# Patient Record
Sex: FEMALE | Race: BLACK OR AFRICAN AMERICAN | NOT HISPANIC OR LATINO | Employment: UNEMPLOYED | URBAN - METROPOLITAN AREA
[De-identification: names, ages, dates, MRNs, and addresses within clinical notes are randomized per-mention and may not be internally consistent; named-entity substitution may affect disease eponyms.]

---

## 2023-05-21 DIAGNOSIS — C50.411 MALIGNANT NEOPLASM OF UPPER-OUTER QUADRANT OF RIGHT BREAST IN FEMALE, ESTROGEN RECEPTOR NEGATIVE: Primary | ICD-10-CM

## 2023-05-21 DIAGNOSIS — Z17.1 MALIGNANT NEOPLASM OF UPPER-OUTER QUADRANT OF RIGHT BREAST IN FEMALE, ESTROGEN RECEPTOR NEGATIVE: Primary | ICD-10-CM

## 2023-05-21 PROBLEM — R92.1 BREAST CALCIFICATION SEEN ON MAMMOGRAM: Status: ACTIVE | Noted: 2023-05-21

## 2023-05-21 NOTE — PROGRESS NOTES
[facility]  MD Ayesha Lew, NP-C    Chief Complaint:   Cali Benedict is a 52 y.o. female presenting today for  6 month follow up for her breast cancer issues. She is due for Physical Examination, Mammogram, and Chest Xray  She reports no interval changes on her self-breast examination.     History of Present Illness:   Mrs. Benedict was diagnosed with right breast cancer in January 2017 at the age of 46. Prior to chemotherapy four [place] Lymph Nodes were negative. She completed rashid-adjuvant chemotherapy. She elected Lumpectomy that showed a 2.6 cm residual Grade III IDC with 2-3 mm of DCIS. Margins were negative. She completed adjuvant Radiation with Dr. Arnold. ER/MS Negative and HER 2 = +1. [facility] Comprehensive Panel Negative (2017). In May 2022 she was noted with benign appearing calcifications in the left breast and close follow up was recommended. MD::: Tracie Bates MD; Micah Castro MD; Jolly Gregory MD.    Past Medical History:   Diagnosis Date    Breast calcification seen on mammogram 5/21/2023    Breast disorder     Breast Neoplasm    Estrogen receptor negative status (ER-) 5/21/2023    Hypertension     Lymphedema     Potassium (K) deficiency       Past Surgical History:   Procedure Laterality Date    BREAST BIOPSY      BREAST LUMPECTOMY Right     CERVICAL BIOPSY      CHOLECYSTECTOMY      COLPOSCOPY      KNEE SURGERY      LASER ABLATION OF THE CERVIX      REDUCTION OF BOTH BREASTS          Current Outpatient Medications:     ADVAIR DISKUS 100-50 mcg/dose diskus inhaler, INHALE 1 PUFF INTO THE LUNGS TWICE DAILY 12 HOURS APART, Disp: , Rfl:     albuterol (PROVENTIL/VENTOLIN HFA) 90 mcg/actuation inhaler, INHALE 2 PUFFS BY MOUTH EVERY 4 HOURS AS NEEDED SHORTNESS OF BREATH, Disp: , Rfl:     amitriptyline (ELAVIL) 100 MG tablet, Take 100 mg by mouth every evening., Disp: , Rfl:     amLODIPine (NORVASC) 5 MG tablet, Take 5 mg by mouth once daily.,  Disp: , Rfl:     atorvastatin (LIPITOR) 40 MG tablet, Take 40 mg by mouth every evening., Disp: , Rfl:     chlorthalidone (HYGROTEN) 25 MG Tab, Take 25 mg by mouth once daily., Disp: , Rfl:     clobetasol 0.05% (TEMOVATE) 0.05 % Oint, , Disp: , Rfl:     estradioL (ESTRACE) 0.01 % (0.1 mg/gram) vaginal cream, Place 1 g vaginally twice a week., Disp: 8 g, Rfl: 11    gabapentin (NEURONTIN) 300 MG capsule, Take 300 mg by mouth 4 (four) times daily., Disp: , Rfl:     inhalat.spacing dev,large mask (OPTICHAMBER GENARO LG MASK) Spcr, USE AS DIRECTED WITH ALBUTEROL INHALER, Disp: , Rfl:     ketoconazole (NIZORAL) 2 % shampoo, Apply topically., Disp: , Rfl:     losartan (COZAAR) 25 MG tablet, Take 50 mg by mouth once daily., Disp: , Rfl:     metFORMIN (GLUCOPHAGE-XR) 500 MG ER 24hr tablet, TAKE 2 TABLETS BY MOUTH DAILY WITH BREAKFAST, Disp: , Rfl:     pregabalin (LYRICA) 150 MG capsule, Take 150 mg by mouth 3 (three) times daily., Disp: , Rfl:     valACYclovir (VALTREX) 500 MG tablet, , Disp: , Rfl:    Review of patient's allergies indicates:   Allergen Reactions    Hydrochloric acid (bulk) Anaphylaxis and Shortness Of Breath    Hydrochlorothiazide     Naproxen       Social History     Tobacco Use    Smoking status: Never    Smokeless tobacco: Never   Substance Use Topics    Alcohol use: Never      No family history on file.     Review of Systems   Integumentary:  Negative for color change, rash, mole/lesion, breast mass, breast discharge and breast tenderness.   Breast: Negative for mass and tenderness     Physical Exam   Constitutional: She is cooperative.   HENT:   Head: Normocephalic.   Pulmonary/Chest: She exhibits no mass. Right breast exhibits no inverted nipple, no mass, no nipple discharge, no skin change and no tenderness. Left breast exhibits no inverted nipple, no mass, no nipple discharge, no skin change and no tenderness.   Abdominal: Normal appearance.   Musculoskeletal: Swelling (right upper extremity  lymphedema) present. Lymphadenopathy:      Upper Body:      Right upper body: No supraclavicular or axillary adenopathy.      Left upper body: No supraclavicular or axillary adenopathy.     Neurological: She is alert.   Skin: No rash noted.      MAMMOGRAM REPORT: There are punctate calcifications seen in the retroareolar region of the left breast. Compared to the previous study, there are no significant changes.There are post-surgical findings seen in the right breast. Compared to the previous study, there are no significant changes.There is no mammographic evidence of malignancy. BI-RADS Category: Overall: 3 - Probably Benign.    CXR REPORT: will obtain today    NOTE:::We viewed her films together at today's visit.  We discussed the multiple views obtained and the important findings.  Even benign changes were mentioned and her questions were answered.  She knows that she may receive a formal letter or report from the Radiologist.  She is to contact us if she has questions.          Assessment/Plan  1. Malignant neoplasm of upper-outer quadrant of right breast in female, estrogen receptor negative  Assessment & Plan:   Patient is doing well and is being followed according to NCCN Guidelines. She understands that her imaging and exams have remained stable and is comfortable being followed in a conservative fashion. She understands the importance of monthly self-breast examination and knows to report any and all changes as they occur.        2. Estrogen receptor negative status (ER-)  Assessment & Plan:  Same as above      3. Breast calcification seen on mammogram  Assessment & Plan:  We reviewed our findings today and her questions were answered.  She understands that her imaging and exams have remained stable (and show nothing concerning).  She is comfortable being followed in a conservative fashion.      She understands the importance of monthly self-breast examination and knows to report any and all changes as  they occur.               Medical Decision Making:  It is my impression that this patient suffers all conditions contained in this medical document.  Each of these conditions did affect our plan of care and my medical decision making today.  It is my opinion that the medical decision making concerning this patient was of moderate difficulty based on the aforementioned conditions.  Any further recommendations will be communicated to the patient by me.  I have reviewed and verified her allergies, list of medications, medical and surgical histories, social history, and a pertinent review of symptoms.      Follow up:  6 months and prn    For:  JG (CHRISS) at [facility]      No orders of the defined types were placed in this encounter.     Addendum 5/30/23 1030 CXR: FINDINGS:  No focal pulmonary opacity or pleural fluid. The cardiac silhouette  is mildly enlarged. Stable slight rightward deviation of the trachea suggests left thyromegaly. No acute osseous abnormality. Surgical clips in the right breast and axilla. Cholecystectomy clips. Impression:  No acute cardiopulmonary disease identified

## 2023-05-30 ENCOUNTER — OFFICE VISIT (OUTPATIENT)
Dept: SURGERY | Facility: CLINIC | Age: 53
End: 2023-05-30
Payer: MEDICARE

## 2023-05-30 DIAGNOSIS — C50.411 MALIGNANT NEOPLASM OF UPPER-OUTER QUADRANT OF RIGHT BREAST IN FEMALE, ESTROGEN RECEPTOR NEGATIVE: Primary | ICD-10-CM

## 2023-05-30 DIAGNOSIS — Z17.1 ESTROGEN RECEPTOR NEGATIVE STATUS (ER-): ICD-10-CM

## 2023-05-30 DIAGNOSIS — Z17.1 MALIGNANT NEOPLASM OF UPPER-OUTER QUADRANT OF RIGHT BREAST IN FEMALE, ESTROGEN RECEPTOR NEGATIVE: Primary | ICD-10-CM

## 2023-05-30 DIAGNOSIS — R92.1 BREAST CALCIFICATION SEEN ON MAMMOGRAM: ICD-10-CM

## 2023-05-30 PROCEDURE — 4010F ACE/ARB THERAPY RXD/TAKEN: CPT | Mod: CPTII,S$GLB,, | Performed by: NURSE PRACTITIONER

## 2023-05-30 PROCEDURE — 99214 PR OFFICE/OUTPT VISIT, EST, LEVL IV, 30-39 MIN: ICD-10-PCS | Mod: S$GLB,,, | Performed by: NURSE PRACTITIONER

## 2023-05-30 PROCEDURE — 99214 OFFICE O/P EST MOD 30 MIN: CPT | Mod: S$GLB,,, | Performed by: NURSE PRACTITIONER

## 2023-05-30 PROCEDURE — 4010F PR ACE/ARB THEARPY RXD/TAKEN: ICD-10-PCS | Mod: CPTII,S$GLB,, | Performed by: NURSE PRACTITIONER

## 2023-10-31 ENCOUNTER — TELEPHONE (OUTPATIENT)
Dept: SURGERY | Facility: CLINIC | Age: 53
End: 2023-10-31
Payer: MEDICARE

## 2023-10-31 NOTE — TELEPHONE ENCOUNTER
----- Message from Manuela Henry sent at 10/31/2023  8:11 AM CDT -----  Contact: Cali  Type:  Needs Medical Advice    Who Called: Cali  Symptoms (please be specific):  Arm with Lymphedema is swollen and had small, red spots on it, doesn't hurt   How long has patient had these symptoms:   1-2 days  Would the patient rather a call back or a response via [facility]?  Call  Best Call Back Number:  [phone]  Additional Information:

## 2023-11-26 DIAGNOSIS — R92.1 BREAST CALCIFICATION SEEN ON MAMMOGRAM: Primary | ICD-10-CM

## 2023-12-04 ENCOUNTER — OFFICE VISIT (OUTPATIENT)
Dept: SURGERY | Facility: CLINIC | Age: 53
End: 2023-12-04
Payer: MEDICARE

## 2023-12-04 DIAGNOSIS — Z17.1 MALIGNANT NEOPLASM OF UPPER-OUTER QUADRANT OF RIGHT BREAST IN FEMALE, ESTROGEN RECEPTOR NEGATIVE: Primary | ICD-10-CM

## 2023-12-04 DIAGNOSIS — Z17.1 ESTROGEN RECEPTOR NEGATIVE STATUS (ER-): ICD-10-CM

## 2023-12-04 DIAGNOSIS — C50.411 MALIGNANT NEOPLASM OF UPPER-OUTER QUADRANT OF RIGHT BREAST IN FEMALE, ESTROGEN RECEPTOR NEGATIVE: Primary | ICD-10-CM

## 2023-12-04 DIAGNOSIS — R92.1 BREAST CALCIFICATION SEEN ON MAMMOGRAM: ICD-10-CM

## 2023-12-04 PROCEDURE — 4010F PR ACE/ARB THEARPY RXD/TAKEN: ICD-10-PCS | Mod: CPTII,S$GLB,, | Performed by: NURSE PRACTITIONER

## 2023-12-04 PROCEDURE — 99999 PR PBB SHADOW E&M-EST. PATIENT-LVL III: CPT | Mod: PBBFAC,,, | Performed by: NURSE PRACTITIONER

## 2023-12-04 PROCEDURE — 1159F MED LIST DOCD IN RCRD: CPT | Mod: CPTII,S$GLB,, | Performed by: NURSE PRACTITIONER

## 2023-12-04 PROCEDURE — 4010F ACE/ARB THERAPY RXD/TAKEN: CPT | Mod: CPTII,S$GLB,, | Performed by: NURSE PRACTITIONER

## 2023-12-04 PROCEDURE — 99214 OFFICE O/P EST MOD 30 MIN: CPT | Mod: S$GLB,,, | Performed by: NURSE PRACTITIONER

## 2023-12-04 PROCEDURE — 1159F PR MEDICATION LIST DOCUMENTED IN MEDICAL RECORD: ICD-10-PCS | Mod: CPTII,S$GLB,, | Performed by: NURSE PRACTITIONER

## 2023-12-04 PROCEDURE — 83615 LACTATE (LD) (LDH) ENZYME: CPT | Performed by: NURSE PRACTITIONER

## 2023-12-04 PROCEDURE — 1160F RVW MEDS BY RX/DR IN RCRD: CPT | Mod: CPTII,S$GLB,, | Performed by: NURSE PRACTITIONER

## 2023-12-04 PROCEDURE — 85025 COMPLETE CBC W/AUTO DIFF WBC: CPT | Performed by: NURSE PRACTITIONER

## 2023-12-04 PROCEDURE — 86300 IMMUNOASSAY TUMOR CA 15-3: CPT | Performed by: NURSE PRACTITIONER

## 2023-12-04 PROCEDURE — 1160F PR REVIEW ALL MEDS BY PRESCRIBER/CLIN PHARMACIST DOCUMENTED: ICD-10-PCS | Mod: CPTII,S$GLB,, | Performed by: NURSE PRACTITIONER

## 2023-12-04 PROCEDURE — 3044F HG A1C LEVEL LT 7.0%: CPT | Mod: CPTII,S$GLB,, | Performed by: NURSE PRACTITIONER

## 2023-12-04 PROCEDURE — 99214 PR OFFICE/OUTPT VISIT, EST, LEVL IV, 30-39 MIN: ICD-10-PCS | Mod: S$GLB,,, | Performed by: NURSE PRACTITIONER

## 2023-12-04 PROCEDURE — 80053 COMPREHEN METABOLIC PANEL: CPT | Performed by: NURSE PRACTITIONER

## 2023-12-04 PROCEDURE — 3044F PR MOST RECENT HEMOGLOBIN A1C LEVEL <7.0%: ICD-10-PCS | Mod: CPTII,S$GLB,, | Performed by: NURSE PRACTITIONER

## 2023-12-04 PROCEDURE — 82378 CARCINOEMBRYONIC ANTIGEN: CPT | Performed by: NURSE PRACTITIONER

## 2023-12-04 PROCEDURE — 99999 PR PBB SHADOW E&M-EST. PATIENT-LVL III: ICD-10-PCS | Mod: PBBFAC,,, | Performed by: NURSE PRACTITIONER

## 2023-12-04 NOTE — PROGRESS NOTES
[facility]  MD Ayesha Lew, NP-C    Date of Service: 12/4/2023      Chief Complaint:   Cali Benedict is a 53 y.o. female presenting today for  6 month follow up. She is due for Mammogram  She reports no interval changes on her self-breast examination.     History of Present Illness:   Mrs. Benedict was diagnosed with right breast cancer in January 2017 at the age of 46. Prior to chemotherapy four [place] Lymph Nodes were negative. She completed rashid-adjuvant chemotherapy. She elected Lumpectomy that showed a 2.6 cm residual Grade III IDC with 2-3 mm of DCIS. Margins were negative. She completed adjuvant Radiation with Dr. Arnold. ER/[place] Negative and HER 2 = +1. [facility] (2017[facility] In May 2022 she was noted with benign appearing calcifications in the left breast and close follow up was recommended. MD::: Tracie Bates MD; Micah Castro MD; Jolly Gregory MD.     Past Medical History:   Diagnosis Date    Breast calcification seen on mammogram 05/21/2023    Breast cancer     RT    Breast disorder     Breast Neoplasm    Estrogen receptor negative status (ER-) 05/21/2023    Hypertension     Lymphedema     Malignant neoplasm of upper-outer quadrant of right breast in female, estrogen receptor negative 05/21/2023    diagnosed in January 2017    Oxygen dependent     2 lnc    Potassium (K) deficiency       Past Surgical History:   Procedure Laterality Date    CERVICAL BIOPSY      CHOLECYSTECTOMY      COLPOSCOPY      KNEE SURGERY      LASER ABLATION OF THE CERVIX      REDUCTION OF BOTH BREASTS Bilateral     right lumpectomy w/ port removal 09/2017      right lumpectomy w/ port removal 09/2017      TOTAL REDUCTION MAMMOPLASTY          Current Outpatient Medications:     ADVAIR DISKUS 100-50 mcg/dose diskus inhaler, INHALE 1 PUFF INTO THE LUNGS TWICE DAILY 12 HOURS APART, Disp: , Rfl:     albuterol (PROVENTIL/VENTOLIN HFA) 90 mcg/actuation  inhaler, INHALE 2 PUFFS BY MOUTH EVERY 4 HOURS AS NEEDED SHORTNESS OF BREATH, Disp: , Rfl:     amitriptyline (ELAVIL) 100 MG tablet, Take 100 mg by mouth every evening., Disp: , Rfl:     amLODIPine (NORVASC) 5 MG tablet, Take 5 mg by mouth once daily., Disp: , Rfl:     atorvastatin (LIPITOR) 40 MG tablet, Take 40 mg by mouth every evening., Disp: , Rfl:     chlorthalidone (HYGROTEN) 25 MG Tab, Take 25 mg by mouth once daily., Disp: , Rfl:     clobetasol 0.05% (TEMOVATE) 0.05 % Oint, , Disp: , Rfl:     estradioL (ESTRACE) 0.01 % (0.1 mg/gram) vaginal cream, Place 1 g vaginally twice a week., Disp: 8 g, Rfl: 11    gabapentin (NEURONTIN) 300 MG capsule, Take 300 mg by mouth 4 (four) times daily., Disp: , Rfl:     inhalat.spacing dev,large mask (OPTICHAMBER GENARO LG MASK) Spcr, USE AS DIRECTED WITH ALBUTEROL INHALER, Disp: , Rfl:     ketoconazole (NIZORAL) 2 % shampoo, Apply topically., Disp: , Rfl:     losartan (COZAAR) 25 MG tablet, Take 50 mg by mouth once daily., Disp: , Rfl:     metFORMIN (GLUCOPHAGE-XR) 500 MG ER 24hr tablet, TAKE 2 TABLETS BY MOUTH DAILY WITH BREAKFAST, Disp: , Rfl:     pregabalin (LYRICA) 150 MG capsule, Take 150 mg by mouth 3 (three) times daily., Disp: , Rfl:     valACYclovir (VALTREX) 500 MG tablet, , Disp: , Rfl:    Review of patient's allergies indicates:   Allergen Reactions    Hydrochloric acid (bulk) Anaphylaxis and Shortness Of Breath    Hydrochlorothiazide     Naproxen       Social History     Tobacco Use    Smoking status: Never    Smokeless tobacco: Never   Substance Use Topics    Alcohol use: Never      Family History   Problem Relation Age of Onset    Breast cancer Maternal Aunt     Ovarian cancer Neg Hx         Review of Systems   Integumentary:  Negative for color change, rash, mole/lesion, breast mass, breast discharge and breast tenderness.   Breast: Negative for mass and tenderness       Physical Exam   HENT:   Head: Normocephalic.   Pulmonary/Chest: Right breast exhibits no  inverted nipple, no mass, no nipple discharge, no skin change and no tenderness. Left breast exhibits no inverted nipple, no mass, no nipple discharge, no skin change and no tenderness. No breast swelling.   Genitourinary: No breast swelling.   Musculoskeletal: Lymphadenopathy:      Upper Body:      Right upper body: No supraclavicular or axillary adenopathy.      Left upper body: No supraclavicular or axillary adenopathy.     Neurological: She is alert.      Mammogram: There are punctate calcifications seen in the retroareolar region of the left breast. Compared to the previous study, there are no significant changes. There are post-surgical findings seen in the right breast. Compared to the previous study, there are no significant changes. There is no mammographic evidence of malignancy.        Assessment/Plan  1. Malignant neoplasm of upper-outer quadrant of right breast in female, estrogen receptor negative  Assessment & Plan:   Patient is doing well and is being followed according to NCCN Guidelines. She understands that her imaging and exams have remained stable and is comfortable being followed in a conservative fashion. She understands the importance of monthly self-breast examination and knows to report any and all changes as they occur.        2. Estrogen receptor negative status (ER-)  Assessment & Plan:  Same as above      3. Breast calcification seen on mammogram  Assessment & Plan:  We reviewed our findings today and her questions were answered.  She understands that her imaging and exams have remained stable (and show nothing concerning).  She is comfortable being followed in a conservative fashion.      She understands the importance of monthly self-breast examination and knows to report any and all changes as they occur.               Medical Decision Making:  It is my impression that this patient suffers all conditions contained in this medical document.  Each of these conditions did affect our  plan of care and my medical decision making today.  It is my opinion that the medical decision making concerning this patient was of moderate difficulty based on the aforementioned conditions.  Any further recommendations will be communicated to the patient by me.  I have reviewed and verified her allergies, list of medications, medical and surgical histories, social history, and a pertinent review of symptoms.      Follow up:  6 months and prn    For:  CXR and Ultrasound with Dr. Zeng

## 2023-12-05 LAB
ALBUMIN SERPL BCP-MCNC: 3.9 G/DL (ref 3.5–5.2)
ALP SERPL-CCNC: 130 U/L (ref 55–135)
ALT SERPL W/O P-5'-P-CCNC: 23 U/L (ref 10–44)
ANION GAP SERPL CALC-SCNC: 11 MMOL/L (ref 8–16)
AST SERPL-CCNC: 22 U/L (ref 10–40)
BASOPHILS # BLD AUTO: 0.07 K/UL (ref 0–0.2)
BASOPHILS NFR BLD: 1 % (ref 0–1.9)
BILIRUB SERPL-MCNC: 0.3 MG/DL (ref 0.1–1)
BUN SERPL-MCNC: 13 MG/DL (ref 6–20)
CALCIUM SERPL-MCNC: 8.9 MG/DL (ref 8.7–10.5)
CEA SERPL-MCNC: 2 NG/ML (ref 0–5)
CHLORIDE SERPL-SCNC: 105 MMOL/L (ref 95–110)
CO2 SERPL-SCNC: 25 MMOL/L (ref 23–29)
CREAT SERPL-MCNC: 0.7 MG/DL (ref 0.5–1.4)
DIFFERENTIAL METHOD: ABNORMAL
EOSINOPHIL # BLD AUTO: 0.3 K/UL (ref 0–0.5)
EOSINOPHIL NFR BLD: 4.6 % (ref 0–8)
ERYTHROCYTE [DISTWIDTH] IN BLOOD BY AUTOMATED COUNT: 14.7 % (ref 11.5–14.5)
EST. GFR  (NO RACE VARIABLE): >60 ML/MIN/1.73 M^2
GLUCOSE SERPL-MCNC: 87 MG/DL (ref 70–110)
HCT VFR BLD AUTO: 38.5 % (ref 37–48.5)
HGB BLD-MCNC: 12.2 G/DL (ref 12–16)
IMM GRANULOCYTES # BLD AUTO: 0.01 K/UL (ref 0–0.04)
IMM GRANULOCYTES NFR BLD AUTO: 0.1 % (ref 0–0.5)
LDH SERPL L TO P-CCNC: 232 U/L (ref 110–260)
LYMPHOCYTES # BLD AUTO: 2.5 K/UL (ref 1–4.8)
LYMPHOCYTES NFR BLD: 34 % (ref 18–48)
MCH RBC QN AUTO: 25.2 PG (ref 27–31)
MCHC RBC AUTO-ENTMCNC: 31.7 G/DL (ref 32–36)
MCV RBC AUTO: 80 FL (ref 82–98)
MONOCYTES # BLD AUTO: 0.5 K/UL (ref 0.3–1)
MONOCYTES NFR BLD: 7.1 % (ref 4–15)
NEUTROPHILS # BLD AUTO: 3.9 K/UL (ref 1.8–7.7)
NEUTROPHILS NFR BLD: 53.2 % (ref 38–73)
NRBC BLD-RTO: 0 /100 WBC
PLATELET # BLD AUTO: 248 K/UL (ref 150–450)
PMV BLD AUTO: 11.3 FL (ref 9.2–12.9)
POTASSIUM SERPL-SCNC: 3.6 MMOL/L (ref 3.5–5.1)
PROT SERPL-MCNC: 7.3 G/DL (ref 6–8.4)
RBC # BLD AUTO: 4.84 M/UL (ref 4–5.4)
SODIUM SERPL-SCNC: 141 MMOL/L (ref 136–145)
WBC # BLD AUTO: 7.35 K/UL (ref 3.9–12.7)

## 2023-12-06 LAB — CANCER AG27-29 SERPL-ACNC: 27.6 U/ML

## 2024-05-20 DIAGNOSIS — Z17.1 MALIGNANT NEOPLASM OF UPPER-OUTER QUADRANT OF RIGHT BREAST IN FEMALE, ESTROGEN RECEPTOR NEGATIVE: Primary | ICD-10-CM

## 2024-05-20 DIAGNOSIS — C50.411 MALIGNANT NEOPLASM OF UPPER-OUTER QUADRANT OF RIGHT BREAST IN FEMALE, ESTROGEN RECEPTOR NEGATIVE: Primary | ICD-10-CM

## 2024-05-20 NOTE — PROGRESS NOTES
Date of Service: 6/10/2024    Chief Complaint:   Cali Benedict is a 53 y.o. female presenting today for her 6 month follow up due to her breast cancer diagnosis.  She is due for her Ultrasound and CXR.  She reports no interval changes on her self-breast examination.     History of Present Illness: Mrs. Benedict was diagnosed with right breast cancer in January 2017 at the age of 46. Prior to chemotherapy four [place] Lymph Nodes were negative. She completed rashid-adjuvant chemotherapy. She elected Lumpectomy that showed a 2.6 cm residual Grade III IDC with 2-3 mm of DCIS. Margins were negative. She completed adjuvant Radiation with Dr. Arnold. ER/[place] Negative and HER 2 = +1. [facility] Comprehensive Panel Negative (2017). In May 2022 she was noted with benign appearing calcifications in the left breast and close follow up was recommended. MD::: Tracie Bates MD; Micah Castro MD; Jolly Gregory MD.     Past Medical History:   Diagnosis Date    Breast calcification seen on mammogram 05/21/2023    Breast cancer     RT    Breast disorder     Breast Neoplasm    Estrogen receptor negative status (ER-) 05/21/2023    Hypertension     Lymphadenopathy     left neck    Lymphedema     Malignant neoplasm of upper-outer quadrant of right breast in female, estrogen receptor negative 05/21/2023    diagnosed in January 2017    Multiple thyroid nodules     Oxygen dependent     2 lnc    Potassium (K) deficiency       Past Surgical History:   Procedure Laterality Date    CERVICAL BIOPSY      CHOLECYSTECTOMY      COLPOSCOPY      FNA of thyroid nodule 11/2023      KNEE SURGERY      LASER ABLATION OF THE CERVIX      REDUCTION OF BOTH BREASTS Bilateral     right lumpectomy w/ port removal 09/2017      right lumpectomy w/ port removal 09/2017      TOTAL REDUCTION MAMMOPLASTY          Current Outpatient Medications:     ADVAIR DISKUS 100-50 mcg/dose diskus inhaler, INHALE 1 PUFF INTO THE LUNGS TWICE DAILY 12 HOURS APART, Disp: , Rfl:      albuterol (PROVENTIL/VENTOLIN HFA) 90 mcg/actuation inhaler, INHALE 2 PUFFS BY MOUTH EVERY 4 HOURS AS NEEDED SHORTNESS OF BREATH, Disp: , Rfl:     amitriptyline (ELAVIL) 100 MG tablet, Take 100 mg by mouth every evening., Disp: , Rfl:     amLODIPine (NORVASC) 5 MG tablet, Take 5 mg by mouth once daily., Disp: , Rfl:     atorvastatin (LIPITOR) 40 MG tablet, Take 40 mg by mouth every evening., Disp: , Rfl:     chlorthalidone (HYGROTEN) 25 MG Tab, Take 25 mg by mouth once daily., Disp: , Rfl:     clobetasol 0.05% (TEMOVATE) 0.05 % Oint, , Disp: , Rfl:     estradioL (ESTRACE) 0.01 % (0.1 mg/gram) vaginal cream, Place 1 g vaginally twice a week., Disp: 8 g, Rfl: 11    gabapentin (NEURONTIN) 300 MG capsule, Take 300 mg by mouth 4 (four) times daily., Disp: , Rfl:     inhalat.spacing dev,large mask (OPTICHAMBER GENARO LG MASK) Spcr, USE AS DIRECTED WITH ALBUTEROL INHALER, Disp: , Rfl:     ketoconazole (NIZORAL) 2 % shampoo, Apply topically., Disp: , Rfl:     losartan (COZAAR) 25 MG tablet, Take 50 mg by mouth once daily., Disp: , Rfl:     metFORMIN (GLUCOPHAGE-XR) 500 MG ER 24hr tablet, TAKE 2 TABLETS BY MOUTH DAILY WITH BREAKFAST, Disp: , Rfl:     pregabalin (LYRICA) 150 MG capsule, Take 150 mg by mouth 3 (three) times daily., Disp: , Rfl:     valACYclovir (VALTREX) 500 MG tablet, , Disp: , Rfl:    Review of patient's allergies indicates:   Allergen Reactions    Hydrochloric acid (bulk) Anaphylaxis and Shortness Of Breath    Hydrochlorothiazide     Naproxen       Social History     Tobacco Use    Smoking status: Never    Smokeless tobacco: Never   Substance Use Topics    Alcohol use: Never      Family History   Problem Relation Name Age of Onset    Breast cancer Maternal Aunt Aunt     Ovarian cancer Neg Hx          Review of Systems   Integumentary:  Negative for color change, rash, mole/lesion, breast mass, breast discharge and breast tenderness.   Breast: Negative for mass and tenderness       Physical Exam    Constitutional: She is cooperative.   HENT:   Head: Normocephalic.   Pulmonary/Chest: She exhibits no mass. Right breast exhibits no inverted nipple, no mass, no nipple discharge, no skin change and no tenderness. Left breast exhibits no inverted nipple, no mass, no nipple discharge, no skin change and no tenderness.   Abdominal: Normal appearance.   Musculoskeletal: Lymphadenopathy:      Upper Body:      Right upper body: No supraclavicular or axillary adenopathy.      Left upper body: No supraclavicular or axillary adenopathy.     Neurological: She is alert.   Skin: No rash noted.     CXR REPORT: Her lungs are well aerated and without worrisome masses; no pleural effusion noted; NEM      ULTRASOUND EVALUATION and REPORT    Bilateral real-time Ultrasound was performed by me.  All four quadrants of the breast, the subareolar and axillary basins were scanned.    She has some heterogeneous dense fibroglandular tissue bilaterally.  We discussed  this dense tissue and its potential implications.    Right Breast: She has normal tissues in the right breast; there's no disruption of the tissue planes (except at the site of her scar(s)) and no abnormal shadowing; she has normal skin thickness with no subcutaneous nodules of skin thickening; stable fat necrosis in UOQ near scar; NEM     Left Breast: She has normal tissues in the left breast; there's no disruption of the tissue planes and no abnormal shadowing; she has normal skin thickness with no subcutaneous nodules or skin thickening; NEM     Axillae: There are no abnormal lymph nodes seen bilaterally.     Impression: Some dense but normal tissue bilaterally with no solid/suspicious masses noted. No LAD in bilateral axillae.      BIRADS: Category 2 - Benign Finding.    Findings were discussed with patient in real time and a hand written report was given to her at the conclusion of the exam.        ASSESSMENT and PLAN OF CARE     1. Malignant neoplasm of upper-outer  quadrant of right breast in female, estrogen receptor negative  Assessment & Plan:  She is doing well and will continue to be followed according to NCCN Guidelines. She understands that her imaging and exams have remained stable and is comfortable being followed in a conservative fashion. She understands the importance of monthly self-breast examination and knows to report any and all changes as they occur.    Labs obtained today: CBC, Chem 12, CEA, LDH, CA 27.29 - after resulted, these will be compared to her previous values and all abnormal values will be phoned to her for discussion.      Orders:  -     Lactate Dehydrogenase  -     Comprehensive Metabolic Panel  -     CEA  -     CBC Auto Differential  -     Cancer Antigen 27-29    Medical Decision Making: It is my impression that this patient suffers all conditions contained in this medical document.  Each of these conditions did affect our plan of care and my medical decision making today.  It is my opinion that the medical decision making concerning this patient was of moderate difficulty based on the aforementioned conditions.  Any further recommendations will be communicated to the patient by me.  I have reviewed and verified her allergies, list of medications, medical and surgical histories, social history, and a pertinent review of symptoms.     Follow up:  6 months and prn    For:  Physical Examination, MGM (D) at [facility], and LABS         DYSPNEA ON EXERTION/SHORTNESS OF BREATH

## 2024-06-10 ENCOUNTER — OFFICE VISIT (OUTPATIENT)
Dept: SURGERY | Facility: CLINIC | Age: 54
End: 2024-06-10
Payer: MEDICARE

## 2024-06-10 DIAGNOSIS — Z17.1 MALIGNANT NEOPLASM OF UPPER-OUTER QUADRANT OF RIGHT BREAST IN FEMALE, ESTROGEN RECEPTOR NEGATIVE: Primary | ICD-10-CM

## 2024-06-10 DIAGNOSIS — C50.411 MALIGNANT NEOPLASM OF UPPER-OUTER QUADRANT OF RIGHT BREAST IN FEMALE, ESTROGEN RECEPTOR NEGATIVE: Primary | ICD-10-CM

## 2024-06-10 LAB
ALBUMIN SERPL BCP-MCNC: 3.8 G/DL (ref 3.5–5.2)
ALP SERPL-CCNC: 191 U/L (ref 55–135)
ALT SERPL W/O P-5'-P-CCNC: 22 U/L (ref 10–44)
ANION GAP SERPL CALC-SCNC: 11 MMOL/L (ref 8–16)
AST SERPL-CCNC: 20 U/L (ref 10–40)
BASOPHILS # BLD AUTO: 0.06 K/UL (ref 0–0.2)
BASOPHILS NFR BLD: 0.9 % (ref 0–1.9)
BILIRUB SERPL-MCNC: 0.2 MG/DL (ref 0.1–1)
BUN SERPL-MCNC: 10 MG/DL (ref 6–20)
CALCIUM SERPL-MCNC: 9.2 MG/DL (ref 8.7–10.5)
CEA SERPL-MCNC: 2.1 NG/ML (ref 0–5)
CHLORIDE SERPL-SCNC: 107 MMOL/L (ref 95–110)
CO2 SERPL-SCNC: 26 MMOL/L (ref 23–29)
CREAT SERPL-MCNC: 0.8 MG/DL (ref 0.5–1.4)
DIFFERENTIAL METHOD BLD: ABNORMAL
EOSINOPHIL # BLD AUTO: 0.3 K/UL (ref 0–0.5)
EOSINOPHIL NFR BLD: 3.9 % (ref 0–8)
ERYTHROCYTE [DISTWIDTH] IN BLOOD BY AUTOMATED COUNT: 14.6 % (ref 11.5–14.5)
EST. GFR  (NO RACE VARIABLE): >60 ML/MIN/1.73 M^2
GLUCOSE SERPL-MCNC: 85 MG/DL (ref 70–110)
HCT VFR BLD AUTO: 37.7 % (ref 37–48.5)
HGB BLD-MCNC: 12 G/DL (ref 12–16)
IMM GRANULOCYTES # BLD AUTO: 0.02 K/UL (ref 0–0.04)
IMM GRANULOCYTES NFR BLD AUTO: 0.3 % (ref 0–0.5)
LDH SERPL L TO P-CCNC: 219 U/L (ref 110–260)
LYMPHOCYTES # BLD AUTO: 1.8 K/UL (ref 1–4.8)
LYMPHOCYTES NFR BLD: 26.1 % (ref 18–48)
MCH RBC QN AUTO: 25.8 PG (ref 27–31)
MCHC RBC AUTO-ENTMCNC: 31.8 G/DL (ref 32–36)
MCV RBC AUTO: 81 FL (ref 82–98)
MONOCYTES # BLD AUTO: 0.5 K/UL (ref 0.3–1)
MONOCYTES NFR BLD: 7.8 % (ref 4–15)
NEUTROPHILS # BLD AUTO: 4.3 K/UL (ref 1.8–7.7)
NEUTROPHILS NFR BLD: 61 % (ref 38–73)
NRBC BLD-RTO: 0 /100 WBC
PLATELET # BLD AUTO: 223 K/UL (ref 150–450)
PMV BLD AUTO: 11.3 FL (ref 9.2–12.9)
POTASSIUM SERPL-SCNC: 3.5 MMOL/L (ref 3.5–5.1)
PROT SERPL-MCNC: 7.1 G/DL (ref 6–8.4)
RBC # BLD AUTO: 4.65 M/UL (ref 4–5.4)
SODIUM SERPL-SCNC: 144 MMOL/L (ref 136–145)
WBC # BLD AUTO: 6.96 K/UL (ref 3.9–12.7)

## 2024-06-10 PROCEDURE — 80053 COMPREHEN METABOLIC PANEL: CPT | Performed by: SPECIALIST

## 2024-06-10 PROCEDURE — 83615 LACTATE (LD) (LDH) ENZYME: CPT | Performed by: SPECIALIST

## 2024-06-10 PROCEDURE — 3044F HG A1C LEVEL LT 7.0%: CPT | Mod: CPTII,S$GLB,, | Performed by: SPECIALIST

## 2024-06-10 PROCEDURE — 1160F RVW MEDS BY RX/DR IN RCRD: CPT | Mod: CPTII,S$GLB,, | Performed by: SPECIALIST

## 2024-06-10 PROCEDURE — 82378 CARCINOEMBRYONIC ANTIGEN: CPT | Performed by: SPECIALIST

## 2024-06-10 PROCEDURE — 4010F ACE/ARB THERAPY RXD/TAKEN: CPT | Mod: CPTII,S$GLB,, | Performed by: SPECIALIST

## 2024-06-10 PROCEDURE — 85025 COMPLETE CBC W/AUTO DIFF WBC: CPT | Performed by: SPECIALIST

## 2024-06-10 PROCEDURE — 1159F MED LIST DOCD IN RCRD: CPT | Mod: CPTII,S$GLB,, | Performed by: SPECIALIST

## 2024-06-10 PROCEDURE — 99999 PR PBB SHADOW E&M-EST. PATIENT-LVL III: CPT | Mod: PBBFAC,,, | Performed by: SPECIALIST

## 2024-06-10 PROCEDURE — 86300 IMMUNOASSAY TUMOR CA 15-3: CPT | Performed by: SPECIALIST

## 2024-06-10 PROCEDURE — 99214 OFFICE O/P EST MOD 30 MIN: CPT | Mod: S$GLB,,, | Performed by: SPECIALIST

## 2024-06-13 LAB — CANCER AG27-29 SERPL-ACNC: 32.7 U/ML

## 2024-08-06 ENCOUNTER — TELEPHONE (OUTPATIENT)
Dept: SURGERY | Facility: CLINIC | Age: 54
End: 2024-08-06
Payer: MEDICARE

## 2024-11-25 DIAGNOSIS — Z17.1 MALIGNANT NEOPLASM OF UPPER-OUTER QUADRANT OF RIGHT BREAST IN FEMALE, ESTROGEN RECEPTOR NEGATIVE: Primary | ICD-10-CM

## 2024-11-25 DIAGNOSIS — C50.411 MALIGNANT NEOPLASM OF UPPER-OUTER QUADRANT OF RIGHT BREAST IN FEMALE, ESTROGEN RECEPTOR NEGATIVE: Primary | ICD-10-CM

## 2024-11-26 NOTE — PROGRESS NOTES
Date of Service: 12/16/2024    Chief Complaint:   Cali Benedict is a 54 y.o. female presenting today for her 6 month follow up due to her breast cancer diagnosis.  She is due for her Mammogram.  She reports no interval changes on her self-breast examination.     History of Present Illness: Mrs. Benedict was diagnosed with right breast cancer in January 2017 at the age of 46. Prior to chemotherapy four Hen[place]er Lymph Nodes were negative. She completed rashid-adjuvant chemotherapy. She elected Lumpectomy that showed a 2.6 cm residual Grade III IDC with 2-3 mm of DCIS. Margins were negative. She completed adjuvant Radiation with Dr. Arnold. ER/AK Negative and HER 2 = +1. [facility] Comprehensive Panel Negative (2017). In May 2022 she was noted with benign appearing calcifications in the left breast and close follow up was recommended. MD::: Tracie Bates MD; Micah Castro MD; Jolly Gregory MD.       Past Medical History:   Diagnosis Date    Breast calcification seen on mammogram 05/21/2023    Breast cancer     RT    Breast disorder     Breast Neoplasm    Estrogen receptor negative status (ER-) 05/21/2023    Hypertension     Lymphadenopathy     left neck    Lymphedema     Malignant neoplasm of upper-outer quadrant of right breast in female, estrogen receptor negative 05/21/2023    diagnosed in January 2017    Multiple thyroid nodules     Oxygen dependent     2 lnc    Potassium (K) deficiency       Past Surgical History:   Procedure Laterality Date    CERVICAL BIOPSY      CHOLECYSTECTOMY      COLPOSCOPY      FNA of thyroid nodule 11/2023      KNEE SURGERY      LASER ABLATION OF THE CERVIX      REDUCTION OF BOTH BREASTS Bilateral     right lumpectomy w/ port removal 09/2017      right lumpectomy w/ port removal 09/2017      TOTAL REDUCTION MAMMOPLASTY          Current Outpatient Medications:     ADVAIR DISKUS 100-50 mcg/dose diskus inhaler, INHALE 1 PUFF INTO THE LUNGS TWICE DAILY 12 HOURS APART, Disp: , Rfl:      albuterol (PROVENTIL/VENTOLIN HFA) 90 mcg/actuation inhaler, INHALE 2 PUFFS BY MOUTH EVERY 4 HOURS AS NEEDED SHORTNESS OF BREATH, Disp: , Rfl:     amitriptyline (ELAVIL) 100 MG tablet, Take 100 mg by mouth every evening., Disp: , Rfl:     amLODIPine (NORVASC) 5 MG tablet, Take 5 mg by mouth once daily., Disp: , Rfl:     atorvastatin (LIPITOR) 40 MG tablet, Take 40 mg by mouth every evening., Disp: , Rfl:     chlorthalidone (HYGROTEN) 25 MG Tab, Take 25 mg by mouth once daily., Disp: , Rfl:     clobetasol 0.05% (TEMOVATE) 0.05 % Oint, , Disp: , Rfl:     estradioL (ESTRACE) 0.01 % (0.1 mg/gram) vaginal cream, Place 1 g vaginally twice a week., Disp: 8 g, Rfl: 11    gabapentin (NEURONTIN) 300 MG capsule, Take 300 mg by mouth 4 (four) times daily., Disp: , Rfl:     inhalat.spacing dev,large mask (OPTICHAMBER GENARO LG MASK) Spcr, USE AS DIRECTED WITH ALBUTEROL INHALER, Disp: , Rfl:     ketoconazole (NIZORAL) 2 % shampoo, Apply topically., Disp: , Rfl:     losartan (COZAAR) 25 MG tablet, Take 50 mg by mouth once daily., Disp: , Rfl:     metFORMIN (GLUCOPHAGE-XR) 500 MG ER 24hr tablet, TAKE 2 TABLETS BY MOUTH DAILY WITH BREAKFAST, Disp: , Rfl:     pregabalin (LYRICA) 150 MG capsule, Take 150 mg by mouth 3 (three) times daily., Disp: , Rfl:     valACYclovir (VALTREX) 500 MG tablet, , Disp: , Rfl:    Review of patient's allergies indicates:   Allergen Reactions    Hydrochloric acid (bulk) Anaphylaxis and Shortness Of Breath    Hydrochlorothiazide     Naproxen       Social History     Tobacco Use    Smoking status: Never    Smokeless tobacco: Never   Substance Use Topics    Alcohol use: Never      Family History   Problem Relation Name Age of Onset    Breast cancer Maternal Aunt Aunt     Ovarian cancer Neg Hx          Review of Systems   Integumentary:  Negative for color change, rash, mole/lesion, breast mass, breast discharge and breast tenderness.   Breast: Negative for mass and tenderness       Physical Exam    Constitutional: She is cooperative.   HENT:   Head: Normocephalic.   Pulmonary/Chest: She exhibits no mass. Right breast exhibits no inverted nipple, no mass, no nipple discharge, no skin change and no tenderness. Left breast exhibits no inverted nipple, no mass, no nipple discharge, no skin change and no tenderness.   Abdominal: Normal appearance.   Musculoskeletal: Lymphadenopathy:      Upper Body:      Right upper body: No supraclavicular or axillary adenopathy.      Left upper body: No supraclavicular or axillary adenopathy.     Neurological: She is alert.   Skin: No rash noted.        MAMMOGRAM REPORT: She has some diffuse fibronodular tissue; there are no spiculated lesions, distortions or suspicious calcifications noted; NEM    ASSESSMENT and PLAN OF CARE     1. Malignant neoplasm of upper-outer quadrant of right breast in female, estrogen receptor negative  Assessment & Plan:  She is doing well and will continue to be followed according to NCCN Guidelines. She understands that her imaging and exams have remained stable and is comfortable being followed in a conservative fashion. She understands the importance of monthly self-breast examination and knows to report any and all changes as they occur.    NOTE:::We viewed her films together at today's visit.  We discussed the multiple views obtained and the important findings.  Even benign changes were mentioned and her questions were answered.  She knows that she may receive a formal letter or report from the Radiologist.  She is to contact us if she has questions.    Labs obtained today: CBC, Chem 12, CEA, LDH, CA 27.29 - after resulted, these will be compared to her previous values and all abnormal values will be phoned to her for discussion.      Orders:  -     Lactate Dehydrogenase; Future; Expected date: 12/16/2024  -     Comprehensive Metabolic Panel; Future; Expected date: 12/16/2024  -     CEA; Future; Expected date: 12/16/2024  -     CBC Auto  Differential; Future; Expected date: 12/16/2024  -     Cancer Antigen 27-29; Future; Expected date: 12/16/2024    Medical Decision Making: ER Neg/Her Pos - It is my impression that the patient suffers from all the listed conditions. Each of these conditions did affect my Plan of Care and all medical decisions that were rendered. The medical decision making was of high difficulty based on her comorbidities and her previous diagnosis of ER Negative/HER 2 Positive breast cancer, which can lead to an increased recurrence rate and pose a direct threat to her life. Laboratory evaluations are currently pending but will be reviewed in the next 24 hours. Any further recommendations will be communicated to the patient by me. I have reviewed and verified her allergies, list of medications, medical and surgical histories, social history, and a pertinent review of symptoms.    Follow up: 6 months and prn    For:  Physical Examination, CXR, LABS, and US with me

## 2024-12-16 ENCOUNTER — LAB VISIT (OUTPATIENT)
Dept: LAB | Facility: HOSPITAL | Age: 54
End: 2024-12-16
Payer: MEDICARE

## 2024-12-16 ENCOUNTER — OFFICE VISIT (OUTPATIENT)
Dept: SURGERY | Facility: CLINIC | Age: 54
End: 2024-12-16
Payer: MEDICARE

## 2024-12-16 DIAGNOSIS — C50.411 MALIGNANT NEOPLASM OF UPPER-OUTER QUADRANT OF RIGHT BREAST IN FEMALE, ESTROGEN RECEPTOR NEGATIVE: Primary | ICD-10-CM

## 2024-12-16 DIAGNOSIS — Z17.1 MALIGNANT NEOPLASM OF UPPER-OUTER QUADRANT OF RIGHT BREAST IN FEMALE, ESTROGEN RECEPTOR NEGATIVE: ICD-10-CM

## 2024-12-16 DIAGNOSIS — C50.411 MALIGNANT NEOPLASM OF UPPER-OUTER QUADRANT OF RIGHT BREAST IN FEMALE, ESTROGEN RECEPTOR NEGATIVE: ICD-10-CM

## 2024-12-16 DIAGNOSIS — Z17.1 MALIGNANT NEOPLASM OF UPPER-OUTER QUADRANT OF RIGHT BREAST IN FEMALE, ESTROGEN RECEPTOR NEGATIVE: Primary | ICD-10-CM

## 2024-12-16 LAB
ALBUMIN SERPL BCP-MCNC: 3.5 G/DL (ref 3.5–5.2)
ALP SERPL-CCNC: 198 U/L (ref 40–150)
ALT SERPL W/O P-5'-P-CCNC: 22 U/L (ref 10–44)
ANION GAP SERPL CALC-SCNC: 6 MMOL/L (ref 8–16)
AST SERPL-CCNC: 24 U/L (ref 10–40)
BASOPHILS # BLD AUTO: 0.05 K/UL (ref 0–0.2)
BASOPHILS NFR BLD: 0.7 % (ref 0–1.9)
BILIRUB SERPL-MCNC: 0.2 MG/DL (ref 0.1–1)
BUN SERPL-MCNC: 13 MG/DL (ref 6–20)
CALCIUM SERPL-MCNC: 8.3 MG/DL (ref 8.7–10.5)
CEA SERPL-MCNC: 1.7 NG/ML (ref 0–5)
CHLORIDE SERPL-SCNC: 112 MMOL/L (ref 95–110)
CO2 SERPL-SCNC: 24 MMOL/L (ref 23–29)
CREAT SERPL-MCNC: 0.8 MG/DL (ref 0.5–1.4)
DIFFERENTIAL METHOD BLD: ABNORMAL
EOSINOPHIL # BLD AUTO: 0.3 K/UL (ref 0–0.5)
EOSINOPHIL NFR BLD: 3.8 % (ref 0–8)
ERYTHROCYTE [DISTWIDTH] IN BLOOD BY AUTOMATED COUNT: 14.8 % (ref 11.5–14.5)
EST. GFR  (NO RACE VARIABLE): >60 ML/MIN/1.73 M^2
GLUCOSE SERPL-MCNC: 93 MG/DL (ref 70–110)
HCT VFR BLD AUTO: 36.2 % (ref 37–48.5)
HGB BLD-MCNC: 11.7 G/DL (ref 12–16)
IMM GRANULOCYTES # BLD AUTO: 0.02 K/UL (ref 0–0.04)
IMM GRANULOCYTES NFR BLD AUTO: 0.3 % (ref 0–0.5)
LDH SERPL L TO P-CCNC: 239 U/L (ref 110–260)
LYMPHOCYTES # BLD AUTO: 2.2 K/UL (ref 1–4.8)
LYMPHOCYTES NFR BLD: 31.1 % (ref 18–48)
MCH RBC QN AUTO: 26 PG (ref 27–31)
MCHC RBC AUTO-ENTMCNC: 32.3 G/DL (ref 32–36)
MCV RBC AUTO: 80 FL (ref 82–98)
MONOCYTES # BLD AUTO: 0.5 K/UL (ref 0.3–1)
MONOCYTES NFR BLD: 7.6 % (ref 4–15)
NEUTROPHILS # BLD AUTO: 4 K/UL (ref 1.8–7.7)
NEUTROPHILS NFR BLD: 56.5 % (ref 38–73)
NRBC BLD-RTO: 0 /100 WBC
PLATELET # BLD AUTO: 200 K/UL (ref 150–450)
PMV BLD AUTO: 10.9 FL (ref 9.2–12.9)
POTASSIUM SERPL-SCNC: 3.6 MMOL/L (ref 3.5–5.1)
PROT SERPL-MCNC: 6.7 G/DL (ref 6–8.4)
RBC # BLD AUTO: 4.5 M/UL (ref 4–5.4)
SODIUM SERPL-SCNC: 142 MMOL/L (ref 136–145)
WBC # BLD AUTO: 7.11 K/UL (ref 3.9–12.7)

## 2024-12-16 PROCEDURE — 3044F HG A1C LEVEL LT 7.0%: CPT | Mod: CPTII,S$GLB,, | Performed by: SPECIALIST

## 2024-12-16 PROCEDURE — 82378 CARCINOEMBRYONIC ANTIGEN: CPT | Performed by: SPECIALIST

## 2024-12-16 PROCEDURE — 86300 IMMUNOASSAY TUMOR CA 15-3: CPT | Performed by: SPECIALIST

## 2024-12-16 PROCEDURE — 99214 OFFICE O/P EST MOD 30 MIN: CPT | Mod: S$GLB,,, | Performed by: SPECIALIST

## 2024-12-16 PROCEDURE — 80053 COMPREHEN METABOLIC PANEL: CPT | Performed by: SPECIALIST

## 2024-12-16 PROCEDURE — 4010F ACE/ARB THERAPY RXD/TAKEN: CPT | Mod: CPTII,S$GLB,, | Performed by: SPECIALIST

## 2024-12-16 PROCEDURE — 83615 LACTATE (LD) (LDH) ENZYME: CPT | Performed by: SPECIALIST

## 2024-12-16 PROCEDURE — 36415 COLL VENOUS BLD VENIPUNCTURE: CPT | Mod: PN | Performed by: SPECIALIST

## 2024-12-16 PROCEDURE — 1159F MED LIST DOCD IN RCRD: CPT | Mod: CPTII,S$GLB,, | Performed by: SPECIALIST

## 2024-12-16 PROCEDURE — 99999 PR PBB SHADOW E&M-EST. PATIENT-LVL I: CPT | Mod: PBBFAC,,, | Performed by: SPECIALIST

## 2024-12-16 PROCEDURE — 85025 COMPLETE CBC W/AUTO DIFF WBC: CPT | Performed by: SPECIALIST

## 2024-12-16 PROCEDURE — 1160F RVW MEDS BY RX/DR IN RCRD: CPT | Mod: CPTII,S$GLB,, | Performed by: SPECIALIST

## 2024-12-18 LAB — CANCER AG27-29 SERPL-ACNC: 31.8 U/ML
